# Patient Record
Sex: MALE | Race: WHITE | NOT HISPANIC OR LATINO | Employment: STUDENT | URBAN - METROPOLITAN AREA
[De-identification: names, ages, dates, MRNs, and addresses within clinical notes are randomized per-mention and may not be internally consistent; named-entity substitution may affect disease eponyms.]

---
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
## 2024-01-22 RX ORDER — SERTRALINE HYDROCHLORIDE 50 MG/1
50 TABLET, FILM COATED ORAL
Qty: 90 TABLET | Refills: 0 | Status: SHIPPED | OUTPATIENT
Start: 2024-01-22 | End: 2024-04-29

## 2024-01-22 NOTE — TELEPHONE ENCOUNTER
He is past due for visit. Needs to schedule appointment for next time he is home.  Only one refill given to hold him until he can be seen.

## 2024-01-23 NOTE — TELEPHONE ENCOUNTER
Left pt a voicemail that he has been given a 3 month supply of his zoloft but he needs an appt to get any further refills.  Asked pt to please return my call so we can schedule him an appt.

## 2025-08-19 ENCOUNTER — PATIENT MESSAGE (OUTPATIENT)
Dept: ADMINISTRATIVE | Facility: HOSPITAL | Age: 20
End: 2025-08-19
Payer: COMMERCIAL